# Patient Record
Sex: FEMALE | Race: WHITE
[De-identification: names, ages, dates, MRNs, and addresses within clinical notes are randomized per-mention and may not be internally consistent; named-entity substitution may affect disease eponyms.]

---

## 2021-10-05 ENCOUNTER — HOSPITAL ENCOUNTER (OUTPATIENT)
Dept: HOSPITAL 79 - HEART 5 | Age: 81
End: 2021-10-05
Attending: INTERNAL MEDICINE
Payer: MEDICARE

## 2021-10-05 DIAGNOSIS — R00.2: ICD-10-CM

## 2021-10-05 DIAGNOSIS — I48.91: Primary | ICD-10-CM

## 2022-01-12 ENCOUNTER — HOSPITAL ENCOUNTER (OUTPATIENT)
Dept: HOSPITAL 79 - HEART 5 | Age: 82
End: 2022-01-12
Attending: INTERNAL MEDICINE
Payer: MEDICARE

## 2022-01-12 DIAGNOSIS — R00.2: Primary | ICD-10-CM

## 2022-01-12 DIAGNOSIS — R00.1: ICD-10-CM

## 2022-01-26 ENCOUNTER — HOSPITAL ENCOUNTER (OUTPATIENT)
Dept: HOSPITAL 79 - HEART 5 | Age: 82
End: 2022-01-26
Attending: INTERNAL MEDICINE
Payer: MEDICARE

## 2022-01-26 DIAGNOSIS — I48.91: Primary | ICD-10-CM

## 2022-01-26 DIAGNOSIS — Z79.899: ICD-10-CM

## 2022-01-26 DIAGNOSIS — Z51.81: ICD-10-CM

## 2022-03-25 ENCOUNTER — HOSPITAL ENCOUNTER (EMERGENCY)
Dept: HOSPITAL 79 - ER1 | Age: 82
LOS: 1 days | Discharge: HOME | End: 2022-03-26
Payer: MEDICARE

## 2022-03-25 DIAGNOSIS — E03.9: ICD-10-CM

## 2022-03-25 DIAGNOSIS — E87.5: ICD-10-CM

## 2022-03-25 DIAGNOSIS — E87.1: Primary | ICD-10-CM

## 2022-03-25 DIAGNOSIS — I10: ICD-10-CM

## 2022-03-25 DIAGNOSIS — I48.91: ICD-10-CM

## 2022-03-25 DIAGNOSIS — Z88.0: ICD-10-CM

## 2022-03-25 DIAGNOSIS — E78.5: ICD-10-CM

## 2022-03-25 LAB
BUN/CREATININE RATIO: 16 (ref 0–10)
BUN/CREATININE RATIO: 17 (ref 0–10)
HGB BLD-MCNC: 13.8 GM/DL (ref 12.3–15.3)
RED BLOOD COUNT: 4.34 M/UL (ref 4–5.1)
WHITE BLOOD COUNT: 6 K/UL (ref 4.5–11)

## 2022-06-20 ENCOUNTER — HOSPITAL ENCOUNTER (OUTPATIENT)
Dept: HOSPITAL 79 - MAMO | Age: 82
End: 2022-06-20
Attending: FAMILY MEDICINE
Payer: MEDICARE

## 2022-06-20 DIAGNOSIS — Z12.31: Primary | ICD-10-CM

## 2022-06-29 ENCOUNTER — HOSPITAL ENCOUNTER (INPATIENT)
Dept: HOSPITAL 79 - ER1 | Age: 82
LOS: 7 days | Discharge: HOME HEALTH | DRG: 640 | End: 2022-07-06
Attending: INTERNAL MEDICINE | Admitting: EMERGENCY MEDICINE
Payer: MEDICARE

## 2022-06-29 VITALS — HEIGHT: 60 IN | WEIGHT: 166 LBS | BODY MASS INDEX: 32.59 KG/M2

## 2022-06-29 DIAGNOSIS — Z82.49: ICD-10-CM

## 2022-06-29 DIAGNOSIS — Z90.710: ICD-10-CM

## 2022-06-29 DIAGNOSIS — I48.0: ICD-10-CM

## 2022-06-29 DIAGNOSIS — K21.9: ICD-10-CM

## 2022-06-29 DIAGNOSIS — Z90.49: ICD-10-CM

## 2022-06-29 DIAGNOSIS — E87.1: Primary | ICD-10-CM

## 2022-06-29 DIAGNOSIS — Z80.1: ICD-10-CM

## 2022-06-29 DIAGNOSIS — Z79.899: ICD-10-CM
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
## 2022-06-29 DIAGNOSIS — E03.9: ICD-10-CM

## 2022-06-29 DIAGNOSIS — T50.2X5A: ICD-10-CM

## 2022-06-29 DIAGNOSIS — Z79.82: ICD-10-CM

## 2022-06-29 DIAGNOSIS — R00.0: ICD-10-CM

## 2022-06-29 DIAGNOSIS — Z85.828: ICD-10-CM

## 2022-06-29 DIAGNOSIS — Z96.659: ICD-10-CM

## 2022-06-29 DIAGNOSIS — R63.1: ICD-10-CM

## 2022-06-29 DIAGNOSIS — I50.42: ICD-10-CM

## 2022-06-29 DIAGNOSIS — E78.5: ICD-10-CM

## 2022-06-29 DIAGNOSIS — Z88.0: ICD-10-CM

## 2022-06-29 DIAGNOSIS — G93.41: ICD-10-CM

## 2022-06-29 DIAGNOSIS — E87.6: ICD-10-CM

## 2022-06-29 DIAGNOSIS — I11.0: ICD-10-CM

## 2022-06-30 LAB
BUN/CREATININE RATIO: 26 (ref 0–10)
HGB BLD-MCNC: 13.5 GM/DL (ref 12.3–15.3)
RED BLOOD COUNT: 4.13 M/UL (ref 4–5.1)
WHITE BLOOD COUNT: 9 K/UL (ref 4.5–11)

## 2022-07-01 LAB
BUN/CREATININE RATIO: 14 (ref 0–10)
BUN/CREATININE RATIO: 24 (ref 0–10)

## 2022-07-01 NOTE — NUR
AT 0415 A NOISE WAS HEARD FROM THE PATIENT'S ROOM. STAFF, INCLUDING BOTH
PRIMARY NURSES AND PRIMARY CNA IMMEDIATELY ENTERED THE ROOM, HAD HEARD THE
NOISE FROM THE NURSES STATION. THE PATIENT'S ROOM WAS IN THE DIRECT LINE OF
SIGHT OF THE NURSE, WHO SAW THE PATIEMT ROLL OUT OF THE BED. UPON ENTERING THE
ROOM, THE PATIENT WAS FOUND SITTING ON THE FLOOR NEXT TO THE BED. APPROPRIATE
SIGNAGE WAS UP OUTSIDE OF THE ROOM. PT WAS HELPED BACK IN BED. SHE STATED SHE
WAS GOING TO THE BATHROOM AND DID NOT KNOW WHERE SHE WAS. PRIOR TO FALLING
ASLEEP, PATIENT WAS ALERT AND ORIENTED X3, HAD USED THE CALL LIGHT FOR HELP TO
THE BEDSIDE COMMODE, AND HAD HAD THE BED ALARM ON PREVIOUSLY IN THE NIGHT. AT
THE TIME OF THE FALL, THE BED ALARM WAS NOT ON. IMMEDIATELY, PATIENT WAS
ASSESSED. SHE HAD A SKIN TEAR ON HER LEFT ARM AND COMPLAINED OF PAIN IN HER
LEFT KNEE, WHICH HAD A SMALL BUMP ON IT. MD WAS CALLED AT 0423, WHO ORDERED A
HEAD CT WITHOUT CONTRAST AND AN XRAY OF THE LEFT KNEE. FAMILY WAS NOTIFIED AT
0431 OF THE INCIDENT AND THE CONFUSION, DAUGHTER FABIOLA. HOUSE SUPERVISOR WAS
NOTIFED AT 0420. REASSESSMENT WAS DONE UPON RETURNING FROM THE CT. PATIENT WAS
ORIENTED AGAIN, KNEW WHERE SHE WAS, WHO SHE WAS, AND WHAT YEAR IT WAS. PATIENT
HAD SAID HER KNEE WAS NO LONGER BOTHERING HER. SHE HAD NO OTHER COMPLAINTS.
BED ALARM WAS PLACED ON PATIENT WITH THREE SIDE RAILS.

## 2022-07-02 LAB
BUN/CREATININE RATIO: 11 (ref 0–10)
BUN/CREATININE RATIO: 19 (ref 0–10)

## 2022-07-03 LAB
BUN/CREATININE RATIO: 16 (ref 0–10)
BUN/CREATININE RATIO: 18 (ref 0–10)
BUN/CREATININE RATIO: 18 (ref 0–10)

## 2022-07-04 LAB
BUN/CREATININE RATIO: 18 (ref 0–10)
BUN/CREATININE RATIO: 25 (ref 0–10)

## 2022-07-05 LAB — BUN/CREATININE RATIO: 18 (ref 0–10)

## 2022-07-06 LAB — BUN/CREATININE RATIO: 27 (ref 0–10)
